# Patient Record
Sex: FEMALE | URBAN - METROPOLITAN AREA
[De-identification: names, ages, dates, MRNs, and addresses within clinical notes are randomized per-mention and may not be internally consistent; named-entity substitution may affect disease eponyms.]

---

## 2022-02-24 ENCOUNTER — TELEPHONE ENCOUNTER (OUTPATIENT)
Dept: URBAN - METROPOLITAN AREA CLINIC 72 | Facility: CLINIC | Age: 37
End: 2022-02-24

## 2022-11-22 ENCOUNTER — OFFICE VISIT (OUTPATIENT)
Dept: URBAN - METROPOLITAN AREA CLINIC 72 | Facility: CLINIC | Age: 37
End: 2022-11-22

## 2022-11-22 PROBLEM — 309298003: Status: ACTIVE | Noted: 2022-11-22

## 2022-11-22 NOTE — HPI-TODAY'S VISIT:
Mrs. Huff is a pleasant 37-year-old female resents as a new patient for consultation for epigastric pain, she was referred by Dr. Pauline Patton, a copy of this consultation will be forwarded to the referring physician.  She has a past medical history of presumed reflux, end-stage renal disease on dialysis, hypertension and tachycardia.  She is status post kidney transplant 2009 that failed.    PCP treated her epigastric pain with Pepcid initially but did not get much improvement then was switched to Nexium.  She is on chronic steroids